# Patient Record
Sex: FEMALE | Race: WHITE | NOT HISPANIC OR LATINO | URBAN - METROPOLITAN AREA
[De-identification: names, ages, dates, MRNs, and addresses within clinical notes are randomized per-mention and may not be internally consistent; named-entity substitution may affect disease eponyms.]

---

## 2017-03-29 ENCOUNTER — TELEPHONE (OUTPATIENT)
Dept: TRANSPLANT | Facility: CLINIC | Age: 27
End: 2017-03-29

## 2017-03-29 NOTE — TELEPHONE ENCOUNTER
Left message asking Patricia to return call for screening. Unknown abo. Need to discuss PEP. Will now send allPhase 1 orders with donor pkt.

## 2017-03-29 NOTE — TELEPHONE ENCOUNTER
"Living Kidney Donor Evaluation Completed: Mar 28, 2017 17:56:20 CT Updated: Mar 29, 2017 09:22:52 CT  Donor Name: Patricia Mcpherson MRN: Note: : 1990 Age: 26Gender: Female Donor Height: 5  4\" Weight (lb): 159 BMI: 27.3  Donor Race:  Ethnicity: Not / Donor Preferred Language: English  Required?: No Current Marital Status:   Demographics: Home Address: [facility] [address] City: [place]: [place] Zip: [address] Country: [place]  Best Phone: [phone] Alt Phone: [phone] Donor Email: [email or web address] Phone Type: Mobile Alt Phone Type:   Preferred Contact Time(s): 11:00 AM-1:00 PM, 1:00 PM-4:00 PM Preferred Contact Day(s): Mon, Tue, Wed, Thur, Fri  Donor Screen: PASSED Donor Referred by: Tx Candidate Donor self reported ABO: Unknown  Recipient Information: Recipient Name (Last, First): Quincy,  April Recipient :    1992  ... Donor Relationship: Full Sibling Recipient Diagnosis: Recipient ABO:   MEDICAL HISTORY:  None Reported  MEDICATIONS:  None Reported  SURGICAL HISTORY:  Cholecystectomy  ALLERGIES:  Morphine : Rash, Anxiety  SOCIAL HISTORY:  EtOH: Rare (1-2 drinks/month)  Illicit Drug Use: Denies  Tobacco: Current (1/ ppd x 9 years)  SELF-REPORTED FUNCTIONAL STATUS:  \"I am able to participate in strenuous sports such as swimming, singles tennis, football, basketball, or skiing\"  Exercise (3 X per week)  REVIEW OF ORGAN SYSTEMS: Airway or Lungs: No Blood Disorder: No Cancer: No Diabetes,Thyroid,Adrenal,Endocrine Disorder: No Digestive or Liver: No Female Health: No Heart or Circulatory System: No Immune Diseases: No Kidneys and Bladder: No Muscles,Bones,Joints: No Neuro: No Psych: No  FAMILY HISTORY: Confirmed:  Cancer (Mother, Grandparent)  Diabetes (Grandparent)  Kidney Disease (Sibling)  Denied:  Heart Disease (denies)  Hypertension (denies)  Kidney Stones (denies)  DONOR INFORMATION:  Level of Education: Some college education Employment " Status: Full Time Employer: [employer] Medical Insurance Status: No medical insurance Current Accommodation: Lives in rented accommodation Living Arrangement: With spouse Allow Disclosure to Recipient: Yes Paired Kidney Exchange Education Level: Has learned about Paired Kidney Exchange Pa[facility] Participation Consent: No Donor Motivation: Highly motivated donor  HIGH RISK BEHAVIOR:  Blood transfusion < 12 months. (NO)  Commercial sex < 12 months. (NO)  Illicit IV drug use < 5yrs. (NO)  Other high risk sexual contact < 12 months. (NO)  EMERGENCY CONTACT INFORMATION:  Primary Secondary First Name: Alexandru Last Name: Quincy Phone Number: [phone] Relationship: Sibling  First Name: John  Last Name: Ky Phone Number: [phone] Relationship: Spouse  REASON FOR DONATION:   To help give my sister the healthy pain free life she deserves.   PHYSICIAN CONTACT INFORMATION:  PCP  Name: [facility]   City: [facility] State: [place]  Phone: [phone]

## 2017-03-30 ENCOUNTER — DOCUMENTATION ONLY (OUTPATIENT)
Dept: TRANSPLANT | Facility: CLINIC | Age: 27
End: 2017-03-30

## 2017-03-30 NOTE — PROGRESS NOTES
Patricia had left a voice message last reagan returning my call. I now tried to reach her and had to LM again to return call for screening.

## 2017-03-31 ENCOUNTER — TELEPHONE (OUTPATIENT)
Dept: TRANSPLANT | Facility: CLINIC | Age: 27
End: 2017-03-31

## 2017-04-24 ENCOUNTER — TELEPHONE (OUTPATIENT)
Dept: TRANSPLANT | Facility: CLINIC | Age: 27
End: 2017-04-24

## 2017-07-20 ENCOUNTER — DOCUMENTATION ONLY (OUTPATIENT)
Dept: TRANSPLANT | Facility: CLINIC | Age: 27
End: 2017-07-20

## 2017-07-24 ENCOUNTER — TELEPHONE (OUTPATIENT)
Dept: TRANSPLANT | Facility: CLINIC | Age: 27
End: 2017-07-24

## 2017-07-24 NOTE — TELEPHONE ENCOUNTER
Stated she's still interested. Had been sick but now can cont process. Requested to resend everything. Will now send Phase 1 orders with donor pkt.

## 2017-10-10 DIAGNOSIS — Z00.5 TRANSPLANT DONOR EVALUATION: Primary | ICD-10-CM

## 2017-10-10 DIAGNOSIS — Z00.5 TRANSPLANT DONOR EVALUATION: ICD-10-CM

## 2017-10-10 LAB
ALBUMIN UR-MCNC: NEGATIVE MG/DL
APPEARANCE UR: ABNORMAL
BILIRUB UR QL STRIP: NEGATIVE
COLOR UR AUTO: YELLOW
CREAT SERPL-MCNC: 0.6 MG/DL (ref 0.52–1.04)
CREAT UR-MCNC: 165 MG/DL
GFR SERPL CREATININE-BSD FRML MDRD: >90 ML/MIN/1.7M2
GLUCOSE SERPL-MCNC: 86 MG/DL (ref 70–99)
GLUCOSE UR STRIP-MCNC: NEGATIVE MG/DL
HGB BLD-MCNC: 14.2 G/DL (ref 11.7–15.7)
HGB UR QL STRIP: NEGATIVE
HYALINE CASTS #/AREA URNS LPF: 3 /LPF (ref 0–2)
KETONES UR STRIP-MCNC: NEGATIVE MG/DL
LEUKOCYTE ESTERASE UR QL STRIP: NEGATIVE
MICROALBUMIN UR-MCNC: 11 MG/L
MICROALBUMIN/CREAT UR: 6.73 MG/G CR (ref 0–25)
MUCOUS THREADS #/AREA URNS LPF: PRESENT /LPF
NITRATE UR QL: NEGATIVE
PH UR STRIP: 6 PH (ref 5–7)
PROT UR-MCNC: 0.12 G/L
PROT/CREAT 24H UR: 0.07 G/G CR (ref 0–0.2)
RBC #/AREA URNS AUTO: 1 /HPF (ref 0–2)
SOURCE: ABNORMAL
SP GR UR STRIP: 1.02 (ref 1–1.03)
SQUAMOUS #/AREA URNS AUTO: 4 /HPF (ref 0–1)
UROBILINOGEN UR STRIP-MCNC: 0 MG/DL (ref 0–2)
WBC #/AREA URNS AUTO: 1 /HPF (ref 0–2)

## 2017-10-17 ENCOUNTER — TELEPHONE (OUTPATIENT)
Dept: TRANSPLANT | Facility: CLINIC | Age: 27
End: 2017-10-17

## 2017-10-17 NOTE — TELEPHONE ENCOUNTER
Phase 1's OK.Average OOZ=142. Her abo was missed. We discussed and she reaffirmed her interest in PEP also.Will now come for eval. Has CD. [place] not left [place] in past 3 months.

## 2017-10-20 ENCOUNTER — TELEPHONE (OUTPATIENT)
Dept: TRANSPLANT | Facility: CLINIC | Age: 27
End: 2017-10-20

## 2017-10-20 DIAGNOSIS — Z00.5 TRANSPLANT DONOR EVALUATION: ICD-10-CM

## 2017-10-20 NOTE — LETTER
October 20, 2017    Patricia Mcpherson  [address] [place], [place] [id number]      Dear Ms. Mcpherson,    Enclosed you will find a copy of your transplant donor evaluation appointment schedule and a map to our location.    If you are unable to come in for your appointments for any reason, please contact Sumi at [phone].      Sincerely,       [facility]    CC: MADHURI Dey LPN Jennifer Wiseman, [facility]                                               EVALUATION SCHEDULE:  POTENTIAL KIDNEY DONOR    Patient:   Patricia Mcpherson  MR#:    [id number]  Coordinator:  Michelle                                       [phone]  :  Lizzy     [phone]  [facility]  Date(s):   October 27, 2017  This is your evaluation schedule, please follow dates and times.  You will   receive reminder phone calls for other tests, but please follow this schedule  only!  If you have any questions about dates and times, please call us on  number listed above.  Thank you, Transplant Services   We encourage you to drink plain water prior to you visit. However,   NO Food or Liquids other than water 12 hours before the first appointment    Day/Date:    Friday, October 27th, 2017  Time Location Activity   6:30a.m. [facility]  ([facility]rd [facility]) Review donor evaluation process    To follow Imaging and Lab testing  ([facility]st [facility] ) Blood tests Fasting   7:00a.m. [facility]  ([facility]nd [facility]) Iohexol   7:20a.m. Imaging and Lab testing  ([facility]st [facility] ) EKG   8:00a.m. [facility]  (3rd [facility]) Appointment with Cony Whitfield  Registered Dietitian   8:30a.m  [facility]  ([facility]) Meet with Transplant Coordinator   9:00a.m. [facility]  ([facility]) Iohexol (2 hours after blood  draws)   9:00a.m. [facility]plant [facility]  (3rd floor [facility]) Appointment with Alem Jerry/Jo Pandey,  Transplant    10:00a.m. Medicine Specialties  ([facility] floor [facility]) Appointment with Dr. Pham,  Transplant Nephrologist   11:00a.m. [facility]plant [facility]  ([address] [place] [address] and [facility]) Iohexol (4 hours after blood draws) and 2nd ABO   11:30a.m. [facility]  ([address] [place] [address] and [facility]) Appointment with Dr. Marquez,  Transplant Surgeon   12:00 p.m. Imaging and lab testing  ([facility]st [facility]) Chest X-ray and CTR = NO FOOD or DRINK 2 HOURS BEFORE TEST!!   After last appt Transplant Services  ([facility] floor [facility]) Check out with [place] nurse ____________,  Transplant RN

## 2017-12-06 DIAGNOSIS — Z00.5 TRANSPLANT DONOR EVALUATION: ICD-10-CM

## 2018-01-05 DIAGNOSIS — Z00.5 TRANSPLANT DONOR EVALUATION: ICD-10-CM
